# Patient Record
Sex: MALE | Race: AMERICAN INDIAN OR ALASKA NATIVE | NOT HISPANIC OR LATINO | URBAN - METROPOLITAN AREA
[De-identification: names, ages, dates, MRNs, and addresses within clinical notes are randomized per-mention and may not be internally consistent; named-entity substitution may affect disease eponyms.]

---

## 2022-06-20 ENCOUNTER — TELEPHONE (OUTPATIENT)
Dept: OPHTHALMOLOGY | Facility: CLINIC | Age: 87
End: 2022-06-20
Payer: MEDICARE

## 2022-06-20 NOTE — TELEPHONE ENCOUNTER
Called patient and scheduled appt    ----- Message from Maggie Downey sent at 6/20/2022 12:01 PM CDT -----  Contact: Floridalma @ [phone]  Can you check on this and call pt   ----- Message -----  From: Lashell Armenta  Sent: 6/20/2022  10:39 AM CDT  To: [facility] Optometry Clinical Support Staff    Patient's daughter Floridalma is calling to confirm if a referral has been received. She isn't sure as to which doctor at [facility] it is supposed to be going to. She stated that it's coming from Dr. Samm Hses.   Please contact Floridalma to discuss at [phone]

## 2022-09-19 ENCOUNTER — OFFICE VISIT (OUTPATIENT)
Dept: OPTOMETRY | Facility: CLINIC | Age: 87
End: 2022-09-19
Payer: MEDICARE

## 2022-09-19 DIAGNOSIS — T85.22XA DISLOCATION OF INTRAOCULAR LENS, INITIAL ENCOUNTER: Primary | ICD-10-CM

## 2022-09-19 PROCEDURE — 3288F FALL RISK ASSESSMENT DOCD: CPT | Mod: CPTII,S$GLB,, | Performed by: OPTOMETRIST

## 2022-09-19 PROCEDURE — 1159F PR MEDICATION LIST DOCUMENTED IN MEDICAL RECORD: ICD-10-PCS | Mod: CPTII,S$GLB,, | Performed by: OPTOMETRIST

## 2022-09-19 PROCEDURE — 1101F PT FALLS ASSESS-DOCD LE1/YR: CPT | Mod: CPTII,S$GLB,, | Performed by: OPTOMETRIST

## 2022-09-19 PROCEDURE — 92004 COMPRE OPH EXAM NEW PT 1/>: CPT | Mod: S$GLB,,, | Performed by: OPTOMETRIST

## 2022-09-19 PROCEDURE — 1126F AMNT PAIN NOTED NONE PRSNT: CPT | Mod: CPTII,S$GLB,, | Performed by: OPTOMETRIST

## 2022-09-19 PROCEDURE — 1160F RVW MEDS BY RX/DR IN RCRD: CPT | Mod: CPTII,S$GLB,, | Performed by: OPTOMETRIST

## 2022-09-19 PROCEDURE — 1101F PR PT FALLS ASSESS DOC 0-1 FALLS W/OUT INJ PAST YR: ICD-10-PCS | Mod: CPTII,S$GLB,, | Performed by: OPTOMETRIST

## 2022-09-19 PROCEDURE — 99999 PR PBB SHADOW E&M-EST. PATIENT-LVL III: ICD-10-PCS | Mod: PBBFAC,,, | Performed by: OPTOMETRIST

## 2022-09-19 PROCEDURE — 99999 PR PBB SHADOW E&M-EST. PATIENT-LVL III: CPT | Mod: PBBFAC,,, | Performed by: OPTOMETRIST

## 2022-09-19 PROCEDURE — 1160F PR REVIEW ALL MEDS BY PRESCRIBER/CLIN PHARMACIST DOCUMENTED: ICD-10-PCS | Mod: CPTII,S$GLB,, | Performed by: OPTOMETRIST

## 2022-09-19 PROCEDURE — 92004 PR EYE EXAM, NEW PATIENT,COMPREHESV: ICD-10-PCS | Mod: S$GLB,,, | Performed by: OPTOMETRIST

## 2022-09-19 PROCEDURE — 1126F PR PAIN SEVERITY QUANTIFIED, NO PAIN PRESENT: ICD-10-PCS | Mod: CPTII,S$GLB,, | Performed by: OPTOMETRIST

## 2022-09-19 PROCEDURE — 1159F MED LIST DOCD IN RCRD: CPT | Mod: CPTII,S$GLB,, | Performed by: OPTOMETRIST

## 2022-09-19 PROCEDURE — 3288F PR FALLS RISK ASSESSMENT DOCUMENTED: ICD-10-PCS | Mod: CPTII,S$GLB,, | Performed by: OPTOMETRIST

## 2022-09-19 RX ORDER — PRAVASTATIN SODIUM 40 MG/1
40 TABLET ORAL NIGHTLY
COMMUNITY
Start: 2022-06-23

## 2022-09-19 RX ORDER — METOPROLOL SUCCINATE 50 MG/1
50 TABLET, EXTENDED RELEASE ORAL DAILY
COMMUNITY
Start: 2022-08-20

## 2022-09-20 NOTE — PROGRESS NOTES
Subjective:       Patient ID: Duc Rodriguez is a 89 y.o. male      Chief Complaint   Patient presents with    Concerns About Ocular Health     History of Present Illness  Dls: 6/11/22  Dr. Samm Hess    89 y/o male presents today with c/o blurry vision at distance and near ou.     Pt does not wear any glasses.     + tearing  No itching   No burning  No pain  No ha's  No floaters  No flashes    Eye meds  None       Assessment/Plan:     1. Dislocation of intraocular lens, initial encounter  Subluxed IOL OU of unknown duration. Referral to Dr. Kate for evaluation.  - Ambulatory referral/consult to Ophthalmology    Follow up for Dr. Kate.

## 2022-10-11 ENCOUNTER — OFFICE VISIT (OUTPATIENT)
Dept: OPHTHALMOLOGY | Facility: CLINIC | Age: 87
End: 2022-10-11
Attending: OPHTHALMOLOGY
Payer: MEDICARE

## 2022-10-11 DIAGNOSIS — T85.22XA DISLOCATED IOL (INTRAOCULAR LENS), POSTERIOR: Primary | ICD-10-CM

## 2022-10-11 DIAGNOSIS — H26.8 PSEUDOEXFOLIATION SYNDROME: ICD-10-CM

## 2022-10-11 PROCEDURE — 92004 COMPRE OPH EXAM NEW PT 1/>: CPT | Mod: S$GLB,,, | Performed by: OPHTHALMOLOGY

## 2022-10-11 PROCEDURE — 1159F MED LIST DOCD IN RCRD: CPT | Mod: CPTII,S$GLB,, | Performed by: OPHTHALMOLOGY

## 2022-10-11 PROCEDURE — 99999 PR PBB SHADOW E&M-EST. PATIENT-LVL II: ICD-10-PCS | Mod: PBBFAC,,, | Performed by: OPHTHALMOLOGY

## 2022-10-11 PROCEDURE — 1160F RVW MEDS BY RX/DR IN RCRD: CPT | Mod: CPTII,S$GLB,, | Performed by: OPHTHALMOLOGY

## 2022-10-11 PROCEDURE — 1160F PR REVIEW ALL MEDS BY PRESCRIBER/CLIN PHARMACIST DOCUMENTED: ICD-10-PCS | Mod: CPTII,S$GLB,, | Performed by: OPHTHALMOLOGY

## 2022-10-11 PROCEDURE — 1159F PR MEDICATION LIST DOCUMENTED IN MEDICAL RECORD: ICD-10-PCS | Mod: CPTII,S$GLB,, | Performed by: OPHTHALMOLOGY

## 2022-10-11 PROCEDURE — 92004 PR EYE EXAM, NEW PATIENT,COMPREHESV: ICD-10-PCS | Mod: S$GLB,,, | Performed by: OPHTHALMOLOGY

## 2022-10-11 PROCEDURE — 99999 PR PBB SHADOW E&M-EST. PATIENT-LVL II: CPT | Mod: PBBFAC,,, | Performed by: OPHTHALMOLOGY

## 2022-10-11 NOTE — PROGRESS NOTES
HPI     Blurred Vision            Comments: Duc oRdriguez is a 89 y/o male           Comments    Pt here for dislocated IOL OU  Pt state blurry VA distance and near OU.          Last edited by Nacho Colvin on 10/11/2022  2:10 PM.            Assessment /Plan     For exam results, see Encounter Report.    Dislocated IOL (intraocular lens), posterior    Pseudoexfoliation syndrome      PXF with completely dislocated IOLs OU into the mid vitreous, visible 3 piece OS, no IOL visible from anterior segment OD  Recommend Retina consult for PPV/IOL removal/IOL scleral fixation    OD Julius 23.58  Ks: 40.0/45.36 at 170    OS Julius  23.92 Kx 41.81/45.32

## 2022-10-18 ENCOUNTER — OFFICE VISIT (OUTPATIENT)
Dept: OPHTHALMOLOGY | Facility: CLINIC | Age: 87
End: 2022-10-18
Payer: MEDICARE

## 2022-10-18 DIAGNOSIS — H43.813 POSTERIOR VITREOUS DETACHMENT, BILATERAL: Primary | ICD-10-CM

## 2022-10-18 DIAGNOSIS — T85.22XA: ICD-10-CM

## 2022-10-18 PROCEDURE — 92014 PR EYE EXAM, EST PATIENT,COMPREHESV: ICD-10-PCS | Mod: S$GLB,,, | Performed by: OPHTHALMOLOGY

## 2022-10-18 PROCEDURE — 92202 OPSCPY EXTND ON/MAC DRAW: CPT | Mod: S$GLB,,, | Performed by: OPHTHALMOLOGY

## 2022-10-18 PROCEDURE — 99999 PR PBB SHADOW E&M-EST. PATIENT-LVL III: CPT | Mod: PBBFAC,,, | Performed by: OPHTHALMOLOGY

## 2022-10-18 PROCEDURE — 1101F PT FALLS ASSESS-DOCD LE1/YR: CPT | Mod: CPTII,S$GLB,, | Performed by: OPHTHALMOLOGY

## 2022-10-18 PROCEDURE — 1159F MED LIST DOCD IN RCRD: CPT | Mod: CPTII,S$GLB,, | Performed by: OPHTHALMOLOGY

## 2022-10-18 PROCEDURE — 1126F PR PAIN SEVERITY QUANTIFIED, NO PAIN PRESENT: ICD-10-PCS | Mod: CPTII,S$GLB,, | Performed by: OPHTHALMOLOGY

## 2022-10-18 PROCEDURE — 92134 POSTERIOR SEGMENT OCT RETINA (OCULAR COHERENCE TOMOGRAPHY)-BOTH EYES: ICD-10-PCS | Mod: S$GLB,,, | Performed by: OPHTHALMOLOGY

## 2022-10-18 PROCEDURE — 1159F PR MEDICATION LIST DOCUMENTED IN MEDICAL RECORD: ICD-10-PCS | Mod: CPTII,S$GLB,, | Performed by: OPHTHALMOLOGY

## 2022-10-18 PROCEDURE — 3288F PR FALLS RISK ASSESSMENT DOCUMENTED: ICD-10-PCS | Mod: CPTII,S$GLB,, | Performed by: OPHTHALMOLOGY

## 2022-10-18 PROCEDURE — 99999 PR PBB SHADOW E&M-EST. PATIENT-LVL III: ICD-10-PCS | Mod: PBBFAC,,, | Performed by: OPHTHALMOLOGY

## 2022-10-18 PROCEDURE — 1101F PR PT FALLS ASSESS DOC 0-1 FALLS W/OUT INJ PAST YR: ICD-10-PCS | Mod: CPTII,S$GLB,, | Performed by: OPHTHALMOLOGY

## 2022-10-18 PROCEDURE — 92014 COMPRE OPH EXAM EST PT 1/>: CPT | Mod: S$GLB,,, | Performed by: OPHTHALMOLOGY

## 2022-10-18 PROCEDURE — 1160F RVW MEDS BY RX/DR IN RCRD: CPT | Mod: CPTII,S$GLB,, | Performed by: OPHTHALMOLOGY

## 2022-10-18 PROCEDURE — 1126F AMNT PAIN NOTED NONE PRSNT: CPT | Mod: CPTII,S$GLB,, | Performed by: OPHTHALMOLOGY

## 2022-10-18 PROCEDURE — 1160F PR REVIEW ALL MEDS BY PRESCRIBER/CLIN PHARMACIST DOCUMENTED: ICD-10-PCS | Mod: CPTII,S$GLB,, | Performed by: OPHTHALMOLOGY

## 2022-10-18 PROCEDURE — 92202 PR OPHTHALMOSCOPY, EXT, W/DRAW OPTIC NERVE/MACULA, I&R, UNI/BI: ICD-10-PCS | Mod: S$GLB,,, | Performed by: OPHTHALMOLOGY

## 2022-10-18 PROCEDURE — 3288F FALL RISK ASSESSMENT DOCD: CPT | Mod: CPTII,S$GLB,, | Performed by: OPHTHALMOLOGY

## 2022-10-18 PROCEDURE — 92134 CPTRZ OPH DX IMG PST SGM RTA: CPT | Mod: S$GLB,,, | Performed by: OPHTHALMOLOGY

## 2022-10-18 RX ORDER — INDOMETHACIN 50 MG/1
50 CAPSULE ORAL 3 TIMES DAILY
COMMUNITY
Start: 2022-07-06

## 2022-10-18 RX ORDER — APIXABAN 2.5 MG/1
2.5 TABLET, FILM COATED ORAL 2 TIMES DAILY
COMMUNITY
Start: 2022-10-03

## 2022-10-18 RX ORDER — IRBESARTAN 150 MG/1
150 TABLET ORAL DAILY
COMMUNITY
Start: 2022-07-21

## 2022-10-18 RX ORDER — QUETIAPINE FUMARATE 25 MG/1
25 TABLET, FILM COATED ORAL NIGHTLY
COMMUNITY
Start: 2022-10-17

## 2022-10-18 RX ORDER — FLUTICASONE PROPIONATE 50 MCG
SPRAY, SUSPENSION (ML) NASAL
COMMUNITY
Start: 2022-10-17

## 2022-10-18 RX ORDER — MONTELUKAST SODIUM 10 MG/1
10 TABLET ORAL DAILY
COMMUNITY
Start: 2022-10-03

## 2022-10-18 RX ORDER — DICLOFENAC SODIUM 10 MG/G
GEL TOPICAL
COMMUNITY
Start: 2022-10-11

## 2022-10-18 NOTE — PROGRESS NOTES
HPI    New Patient- ref by Dr. Kate    Dislocated IOL ou    Worsening VA ou x 2 mo per pt, no pain or f/f.    AT ou prn  Last edited by Maggie Buckner on 10/18/2022  9:25 AM.          OCT - no ME OU      A/P    Dislocated IOL OU from PXE  OD - inferiorly dislocated  OS - inferiorly subluxed    Given age - would not recommend PPV/IOL removal and scleral fixated IOL    Would try CL eval first  If patient not happy with subluxed IOL OS - can see Dr. Park for modified Dave    2. PVD OU      Me PRN

## 2022-12-28 ENCOUNTER — OFFICE VISIT (OUTPATIENT)
Dept: OPTOMETRY | Facility: CLINIC | Age: 87
End: 2022-12-28
Payer: MEDICARE

## 2022-12-28 DIAGNOSIS — T85.22XA DISLOCATION OF INTRAOCULAR LENS, INITIAL ENCOUNTER: Primary | ICD-10-CM

## 2022-12-28 DIAGNOSIS — H52.03 HYPEROPIA OF BOTH EYES: ICD-10-CM

## 2022-12-28 PROCEDURE — 3288F PR FALLS RISK ASSESSMENT DOCUMENTED: ICD-10-PCS | Mod: CPTII,S$GLB,, | Performed by: OPTOMETRIST

## 2022-12-28 PROCEDURE — 92014 PR EYE EXAM, EST PATIENT,COMPREHESV: ICD-10-PCS | Mod: S$GLB,,, | Performed by: OPTOMETRIST

## 2022-12-28 PROCEDURE — 92015 PR REFRACTION: ICD-10-PCS | Mod: S$GLB,,, | Performed by: OPTOMETRIST

## 2022-12-28 PROCEDURE — 1101F PR PT FALLS ASSESS DOC 0-1 FALLS W/OUT INJ PAST YR: ICD-10-PCS | Mod: CPTII,S$GLB,, | Performed by: OPTOMETRIST

## 2022-12-28 PROCEDURE — 1159F PR MEDICATION LIST DOCUMENTED IN MEDICAL RECORD: ICD-10-PCS | Mod: CPTII,S$GLB,, | Performed by: OPTOMETRIST

## 2022-12-28 PROCEDURE — 3288F FALL RISK ASSESSMENT DOCD: CPT | Mod: CPTII,S$GLB,, | Performed by: OPTOMETRIST

## 2022-12-28 PROCEDURE — 1159F MED LIST DOCD IN RCRD: CPT | Mod: CPTII,S$GLB,, | Performed by: OPTOMETRIST

## 2022-12-28 PROCEDURE — 99999 PR PBB SHADOW E&M-EST. PATIENT-LVL II: ICD-10-PCS | Mod: PBBFAC,,, | Performed by: OPTOMETRIST

## 2022-12-28 PROCEDURE — 99999 PR PBB SHADOW E&M-EST. PATIENT-LVL II: CPT | Mod: PBBFAC,,, | Performed by: OPTOMETRIST

## 2022-12-28 PROCEDURE — 1101F PT FALLS ASSESS-DOCD LE1/YR: CPT | Mod: CPTII,S$GLB,, | Performed by: OPTOMETRIST

## 2022-12-28 PROCEDURE — 92014 COMPRE OPH EXAM EST PT 1/>: CPT | Mod: S$GLB,,, | Performed by: OPTOMETRIST

## 2022-12-28 PROCEDURE — 92015 DETERMINE REFRACTIVE STATE: CPT | Mod: S$GLB,,, | Performed by: OPTOMETRIST

## 2022-12-28 NOTE — PROGRESS NOTES
HPI    MRX      89 y.o. is here with gl rx  Pt worn otc readers but has not worn them in a year  Pt OS gives him problems often because of droopy eye  Pt sees blurry   Pt feels his vision has gotten worst since last eye exam    (-)Flashes (-)Floaters  (-)Itch, (+)tear, (-)burn, (+)Dryness.  (+) OTC Drops Re wetting drops  (-)Photophobia(-)Glare   (-) Headaches (-) Eye Pain (-) Double vision    Eye Medications: None    No Past Ocular history     No Family Ocular history     Last edited by Arian Field, OD on 12/28/2022  3:11 PM.            Assessment /Plan     For exam results, see Encounter Report.    Dislocation of intraocular lens, initial encounter  Hyperopia of both eyes  Very good visual outcome OU, recommend sRx  Eyeglass Final Rx       Eyeglass Final Rx         Sphere Cylinder Dist VA Add    Right +12.00 Sphere 20/70 +2.50    Left +10.00 Sphere 20/40 +2.50      Type: Bifocal    Expiration Date: 12/28/2023                      RTC 1 yr

## 2025-01-13 ENCOUNTER — OFFICE VISIT (OUTPATIENT)
Dept: URGENT CARE | Facility: CLINIC | Age: OVER 89
End: 2025-01-13
Payer: MEDICARE

## 2025-01-13 ENCOUNTER — OCHSNER VIRTUAL EMERGENCY DEPARTMENT (OUTPATIENT)
Facility: CLINIC | Age: OVER 89
End: 2025-01-13
Payer: MEDICARE

## 2025-01-13 VITALS
SYSTOLIC BLOOD PRESSURE: 71 MMHG | DIASTOLIC BLOOD PRESSURE: 35 MMHG | HEART RATE: 90 BPM | RESPIRATION RATE: 16 BRPM | OXYGEN SATURATION: 95 % | TEMPERATURE: 99 F | WEIGHT: 125 LBS

## 2025-01-13 DIAGNOSIS — I48.0 PAROXYSMAL ATRIAL FIBRILLATION: ICD-10-CM

## 2025-01-13 DIAGNOSIS — I10 ESSENTIAL HYPERTENSION: ICD-10-CM

## 2025-01-13 DIAGNOSIS — R05.9 COUGH, UNSPECIFIED TYPE: Primary | ICD-10-CM

## 2025-01-13 DIAGNOSIS — Z79.01 LONG TERM CURRENT USE OF ANTICOAGULANT: ICD-10-CM

## 2025-01-13 DIAGNOSIS — I95.9 HYPOTENSION, UNSPECIFIED HYPOTENSION TYPE: ICD-10-CM

## 2025-01-13 DIAGNOSIS — E78.5 DYSLIPIDEMIA: ICD-10-CM

## 2025-01-13 PROBLEM — I48.91 ATRIAL FIBRILLATION: Chronic | Status: ACTIVE | Noted: 2025-01-13

## 2025-01-13 PROBLEM — M10.9 GOUT: Chronic | Status: ACTIVE | Noted: 2025-01-13

## 2025-01-13 PROBLEM — J18.9 PNEUMONIA: Status: ACTIVE | Noted: 2025-01-13

## 2025-01-13 PROBLEM — K21.9 GERD (GASTROESOPHAGEAL REFLUX DISEASE): Chronic | Status: ACTIVE | Noted: 2025-01-13

## 2025-01-13 LAB
CTP QC/QA: YES
CTP QC/QA: YES
POC MOLECULAR INFLUENZA A AGN: NEGATIVE
POC MOLECULAR INFLUENZA B AGN: NEGATIVE
SARS-COV-2 AG RESP QL IA.RAPID: NEGATIVE

## 2025-01-13 PROCEDURE — 87502 INFLUENZA DNA AMP PROBE: CPT | Mod: QW,S$GLB,, | Performed by: PHYSICIAN ASSISTANT

## 2025-01-13 PROCEDURE — 87811 SARS-COV-2 COVID19 W/OPTIC: CPT | Mod: QW,S$GLB,, | Performed by: PHYSICIAN ASSISTANT

## 2025-01-13 PROCEDURE — 71046 X-RAY EXAM CHEST 2 VIEWS: CPT | Mod: S$GLB,,, | Performed by: RADIOLOGY

## 2025-01-13 PROCEDURE — 99205 OFFICE O/P NEW HI 60 MIN: CPT | Mod: S$GLB,,, | Performed by: PHYSICIAN ASSISTANT

## 2025-01-13 NOTE — PLAN OF CARE-OVED
[facility] Emergency Department Plan of Care Note    Referral source: Urgent Care      Reason for consult: Fever      Additional History: Pt presents with his wife and daughter for cough x5 days now. Also has had SOB and weakness. BP here is 70/30s and patient looks pale. I believe he has pneumonia in the RLL (obtaining CXR, Flu and COVID now)       Disposition recommended: Emergency Department

## 2025-01-13 NOTE — PROGRESS NOTES
Subjective:      Patient ID: Michael Xavier is a 89 y.o. male.    Vitals:  weight is 56.7 kg (125 lb). His oral temperature is 98.8 °F (37.1 °C). His blood pressure is 71/35 (abnormal) and his pulse is 90. His respiration is 16 and oxygen saturation is 95%.     Chief Complaint: Cough    Pt states that he has a cough since Thursday. Pt states he has bee short of breath. Pt has been taking otc meds.      Patient presents to the clinic with his daughter who translates for him.  She reports that patient has had a cough for the past 5 days now.  Cough has been productive of yellow sputum.  Reports that he is also reported shortness of breath, generalized weakness and malaise.  He has been taking Advil/Tylenol for symptoms but no cough suppressant.  His wife is ill with similar symptoms as well.  He denies chest pain.    Cough  This is a new problem. Episode onset: 4 days ago. The problem has been gradually worsening. Associated symptoms include a fever (at night time (subjective)) and shortness of breath. Pertinent negatives include no hemoptysis, postnasal drip, sore throat, sweats or weight loss. Nothing aggravates the symptoms. Treatments tried: otc meds. The treatment provided no relief. There is no history of asthma or environmental allergies.       Constitution: Positive for fatigue, fever (at night time (subjective)) and generalized weakness.   HENT:  Negative for postnasal drip and sore throat.    Respiratory:  Positive for cough and shortness of breath. Negative for bloody sputum.    Gastrointestinal:  Negative for rectal bleeding.   Skin:  Positive for pale.   Allergic/Immunologic: Negative for environmental allergies.   Neurological:  Positive for dizziness and light-headedness.      Objective:     Physical Exam   Constitutional: He is oriented to person, place, and time. He appears well-developed. He is cooperative. He appears ill. No distress.   HENT:   Head: Normocephalic and atraumatic.   Ears:   Right  Ear: Hearing and external ear normal.   Left Ear: Hearing and external ear normal.   Nose: Congestion present. No mucosal edema, rhinorrhea or nasal deformity. No epistaxis. Right sinus exhibits no maxillary sinus tenderness and no frontal sinus tenderness. Left sinus exhibits no maxillary sinus tenderness and no frontal sinus tenderness.   Mouth/Throat: Uvula is midline, oropharynx is clear and moist and mucous membranes are normal. No trismus in the jaw. Normal dentition. No uvula swelling. No oropharyngeal exudate, posterior oropharyngeal edema or posterior oropharyngeal erythema.   Eyes: Conjunctivae and lids are normal. No scleral icterus.   Neck: Trachea normal and phonation normal. Neck supple. No edema present. No erythema present. No neck rigidity present.   Cardiovascular: Normal rate and normal pulses.   Pulmonary/Chest: Effort normal. No respiratory distress. He has no decreased breath sounds. He has no rhonchi. He has rales (right mid/lower lung fields).   Abdominal: Normal appearance.   Musculoskeletal: Normal range of motion.         General: No deformity. Normal range of motion.   Neurological: He is alert and oriented to person, place, and time. He exhibits normal muscle tone. Coordination normal.   Skin: Skin is warm, dry, intact, not diaphoretic and pale.   Psychiatric: His speech is normal.   Nursing note and vitals reviewed.      Assessment:     1. Cough, unspecified type    2. Essential hypertension    3. Dyslipidemia    4. Long term current use of anticoagulant    5. Paroxysmal atrial fibrillation    6. Hypotension, unspecified hypotension type        Plan:       Cough, unspecified type  -     POCT Influenza A/B MOLECULAR  -     XR CHEST PA AND LATERAL; Future; Expected date: 01/13/2025  -     Refer to Emergency Dept.  -     SARS Coronavirus 2 Antigen, POCT Manual Read    Essential hypertension    Dyslipidemia    Long term current use of anticoagulant    Paroxysmal atrial  fibrillation    Hypotension, unspecified hypotension type  -     Refer to Emergency Dept.          Medical Decision Making:   History:   I obtained history from: someone other than patient.  Old Medical Records: I decided to obtain old medical records.  Independently Interpreted Test(s):   I have ordered and independently interpreted X-rays - see summary below.  Clinical Tests:   Lab Tests: Ordered and Reviewed  Radiological Study: Ordered and Reviewed  Urgent Care Management:  A. Problem List:   -Acute: Hypotension, cough    -Chronic: HTN, paroxysmal A-fib, dyslipidemia  B. Differential diagnosis: sepsis, pneumonia, dehydration   C. Diagnostic Testing Ordered: CXR, COVID, Flu   D. Diagnostic Testing Considered: Labs   E. Independent Historians: Daughter (acting as -declined translation services here)  F. Urgent Care Midlevel Independent Results Interpretation: COVID negative, Flu negative, CXR reviewed by me- concerning for right lobe pneumonia  G. Radiology: See radiology report above   H. Review of Previous Medical Records: In August at PCP clinic, his BP was 132/68  I. Home Medications Reviewed  J. Social Determinants of Health considered  K. Medical Decision Making and Disposition:   Patient presents to the clinic with his daughter with complaints of a cough for the past 5 days with associated shortness of breath, lightheadedness and generalized weakness.  On exam, he is ill appearing and found to have a blood pressure of 71/35.  He has rales in the right mid and right lower lung fields.  Appears pale.  Discussed with Dr. Ward who agreed on need for ED via EMS. Patient's daughter refused EMS transport on several occasions. She brought patient to the [facility]. He was placed in a wheelchair and transported into her car. Dr. Prasad with [facility] on call did agree with need for further workup in  ED. He will likely need admission.